# Patient Record
Sex: FEMALE | URBAN - NONMETROPOLITAN AREA
[De-identification: names, ages, dates, MRNs, and addresses within clinical notes are randomized per-mention and may not be internally consistent; named-entity substitution may affect disease eponyms.]

---

## 2021-09-20 ENCOUNTER — APPOINTMENT (RX ONLY)
Dept: URBAN - NONMETROPOLITAN AREA CLINIC 28 | Facility: CLINIC | Age: 26
Setting detail: DERMATOLOGY
End: 2021-09-20

## 2021-09-20 DIAGNOSIS — Z00.8 ENCOUNTER FOR OTHER GENERAL EXAMINATION: ICD-10-CM

## 2021-09-20 DIAGNOSIS — L81.0 POSTINFLAMMATORY HYPERPIGMENTATION: ICD-10-CM

## 2021-09-20 PROCEDURE — ? OTHER

## 2021-09-20 PROCEDURE — 99202 OFFICE O/P NEW SF 15 MIN: CPT

## 2021-09-20 PROCEDURE — ? PRESCRIPTION

## 2021-09-20 PROCEDURE — ? DIAGNOSIS COMMENT

## 2021-09-20 PROCEDURE — ? COUNSELING

## 2021-09-20 PROCEDURE — ? TREATMENT REGIMEN

## 2021-09-20 RX ORDER — MEDICAL SUPPLY, MISCELLANEOUS
KIT MISCELLANEOUS
Qty: 30 | Refills: 1 | Status: ERX | COMMUNITY
Start: 2021-09-20

## 2021-09-20 RX ADMIN — Medication: at 00:00

## 2021-09-20 ASSESSMENT — LOCATION SIMPLE DESCRIPTION DERM: LOCATION SIMPLE: UPPER LIP

## 2021-09-20 ASSESSMENT — LOCATION DETAILED DESCRIPTION DERM: LOCATION DETAILED: PHILTRUM

## 2021-09-20 ASSESSMENT — LOCATION ZONE DERM: LOCATION ZONE: LIP

## 2021-09-20 NOTE — PROCEDURE: DIAGNOSIS COMMENT
Comment: Patient states that when she gets mustache waxed it is darker.
Detail Level: Simple
Render Risk Assessment In Note?: no

## 2021-09-20 NOTE — PROCEDURE: TREATMENT REGIMEN
Otc Regimen: Suzan Dean cream hair remover
Detail Level: Zone
Initiate Treatment: HQ 4%+Tret 0.025%+ Kojic acid 5%+HC 0.5%: Apply topically to face  x 3 months, then take 1 month break.